# Patient Record
Sex: FEMALE | Race: WHITE
[De-identification: names, ages, dates, MRNs, and addresses within clinical notes are randomized per-mention and may not be internally consistent; named-entity substitution may affect disease eponyms.]

---

## 2018-05-15 ENCOUNTER — HOSPITAL ENCOUNTER (EMERGENCY)
Dept: HOSPITAL 97 - ER | Age: 58
Discharge: HOME | End: 2018-05-15
Payer: COMMERCIAL

## 2018-05-15 DIAGNOSIS — Z85.3: ICD-10-CM

## 2018-05-15 DIAGNOSIS — F17.210: ICD-10-CM

## 2018-05-15 DIAGNOSIS — F32.9: ICD-10-CM

## 2018-05-15 DIAGNOSIS — L30.9: Primary | ICD-10-CM

## 2018-05-15 PROCEDURE — 99283 EMERGENCY DEPT VISIT LOW MDM: CPT

## 2018-05-15 NOTE — ER
Nurse's Notes                                                                                     

 [facility]                                                                

Name: Ghislaine Xiao                                                                                

Age: 57 yrs                                                                                       

Sex: Female                                                                                       

: 1960                                                                                   

MRN: [id number]                                                                                   

Arrival Date: 05/15/2018                                                                          

Time: 03:40                                                                                       

Account#: [id number]                                                                            

Bed 2                                                                                             

Private MD:                                                                                       

Diagnosis: Dermatitis, unspecified                                                                

                                                                                                  

Presentation:                                                                                     

05/15                                                                                             

03:50 Presenting complaint: Patient states: bilateral ankle swelling and rash to lower legs   ak1 

      since Saturday. Transition of care: patient was not received from another setting of        

      care. Onset of symptoms is unknown. Initial Sepsis Screen: Does the patient meet any 2      

      criteria? No. Patient's initial sepsis screen is negative. Does the patient have a          

      suspected source of infection? No. Patient's initial sepsis screen is negative. Care        

      prior to arrival: None.                                                                     

03:50 Method Of Arrival: Ambulatory                                                           ak1 

03:50 Acuity: ANASTASIA 4                                                                           ak1 

                                                                                                  

Triage Assessment:                                                                                

03:54 General: Appears in no apparent distress. Behavior is calm, cooperative. Pain: Denies   ak1 

      pain.                                                                                       

                                                                                                  

Historical:                                                                                       

- Allergies:                                                                                      

03:52 No Known Allergies;                                                                     ak1 

- Home Meds:                                                                                      

03:52 anastrozole 1 mg Oral tab [Active]; venlafaxine 75 mg Oral cp24 1 cap once daily        ak1 

      [Active]; venlafaxine 37.5 mg Oral tab daily [Active]; unknown antifungal [Active];         

- PMHx:                                                                                           

03:52 Cancer, Breast; radiation therapy; Depression;                                          ak1 

- PSHx:                                                                                           

03:52 Cholecystectomy; Tubal ligation; Lumpectomy;                                            ak1 

                                                                                                  

- Immunization history:: Adult Immunizations unknown.                                             

- Social history:: Smoking status: Patient uses tobacco products, smokes one-half pack            

  cigarettes per day.                                                                             

                                                                                                  

                                                                                                  

Screenin:53 Abuse screen: Denies threats or abuse. Denies injuries from another. Nutritional        ak1 

      screening: No deficits noted. Tuberculosis screening: No symptoms or risk factors           

      identified. Fall Risk None identified.                                                      

                                                                                                  

Assessment:                                                                                       

03:54 General: Appears in no apparent distress. Behavior is calm, cooperative, appropriate    lp1 

      for age. Pain: Denies pain. Neuro: Level of Consciousness is awake, alert, obeys            

      commands. Cardiovascular: Patient's skin is warm and dry. Edema is 1+ to Bilateral          

      ankles. Respiratory: Respiratory effort is even, unlabored, Respiratory pattern is          

      regular, symmetrical. GI: No signs and/or symptoms were reported involving the              

      gastrointestinal system. : No signs and/or symptoms were reported regarding the           

      genitourinary system. EENT: No signs and/or symptoms were reported regarding the EENT       

      system. Derm: Rash noted that is red, vesicular, on bilateral lower extremities Denies      

      itching, burning, pain. Musculoskeletal: Circulation, motion, and sensation intact.         

                                                                                                  

Vital Signs:                                                                                      

03:52  / 78; Pulse 74; Resp 18; Temp 98.1(O); Pulse Ox 98% on R/A; Weight 70.31 kg (R); ak1 

      Height 5 ft. 4 in. (162.56 cm) (R); Pain 0/10;                                              

03:52 Body Mass Index 26.61 (70.31 kg, 162.56 cm)                                             ak1 

                                                                                                  

ED Course:                                                                                        

03:40 Patient arrived in ED.                                                                  es  

03:48 Avel Blackman MD is Attending Physician.                                            tw4 

03:50 Triage completed.                                                                       ak1 

03:52 Arm band placed on Patient placed in an exam room, on a stretcher, on pulse oximetry,   ak1 

      Patient notified of wait time.                                                              

03:54 Sil Taylor, RN is Primary Nurse.                                                       lp1 

03:54 Patient has correct armband on for positive identification. Placed in gown. Bed in low  ak1 

      position. Call light in reach. Side rails up X 1. Pulse ox on. NIBP on.                     

04:00 No provider procedures requiring assistance completed. Patient did not have IV access   lp1 

      during this emergency room visit.                                                           

                                                                                                  

Administered Medications:                                                                         

04:07 Drug: predniSONE 40 mg Route: PO;                                                       lp1 

04:24 Follow up: Response: No adverse reaction                                                lp1 

                                                                                                  

                                                                                                  

Outcome:                                                                                          

04:05 Discharge ordered by MD.                                                                tw4 

04:24 Discharged to home ambulatory.                                                          lp1 

04:24 Condition: good                                                                             

04:24 Discharge instructions given to patient, Instructed on discharge instructions, follow       

      up and referral plans. medication usage, Demonstrated understanding of instructions,        

      follow-up care, medications, Prescriptions given X 1.                                       

04:25 Patient left the ED.                                                                    lp1 

                                                                                                  

Signatures:                                                                                       

Brie Jenkins Laura, RN                         RN   lp1                                                  

Cornelia Spencer RN                       RN   ak1                                                  

Avel Blackman MD MD   tw4                                                  

                                                                                                  

Corrections: (The following items were deleted from the chart)                                    

04:00 03:54 Derm: Rash noted that is red, vesicular, Denies itching, burning, pain, lp1       lp1 

04:25 03:54 Cardiovascular: Patient's skin is warm and dry. lp1                               lp1 

                                                                                                  

**************************************************************************************************

## 2018-05-15 NOTE — EDPHYS
Physician Documentation                                                                           

 [facility]                                                                

Name: Ghislaine Xiao                                                                                

Age: 57 yrs                                                                                       

Sex: Female                                                                                       

: 1960                                                                                   

MRN: [id number]                                                                                   

Arrival Date: 05/15/2018                                                                          

Time: 03:40                                                                                       

Account#: [id number]                                                                            

Bed 2                                                                                             

Private MD:                                                                                       

ED Physician Avel Blackman                                                                     

HPI:                                                                                              

05/15                                                                                             

04:02 This 57 yrs old  Female presents to ER via Ambulatory with complaints of Rash, tw4 

      Feet Swelling.                                                                              

04:02 The patient's rash thought to be caused by an unknown cause. The rash is located on the tw4 

      right leg and left shin. The rash can be described as macular. Onset: The                   

      symptoms/episode began/occurred 3 day(s) ago. Associated signs and symptoms: Pertinent      

      positives: None. Pertinent negatives: None. Severity of symptoms: At their worst the        

      symptoms were moderate in the emergency department the symptoms are unchanged. The          

      patient has not experienced similar symptoms in the past.                                   

                                                                                                  

Historical:                                                                                       

- Allergies:                                                                                      

03:52 No Known Allergies;                                                                     ak1 

- Home Meds:                                                                                      

03:52 anastrozole 1 mg Oral tab [Active]; venlafaxine 75 mg Oral cp24 1 cap once daily        ak1 

      [Active]; venlafaxine 37.5 mg Oral tab daily [Active]; unknown antifungal [Active];         

- PMHx:                                                                                           

03:52 Cancer, Breast; radiation therapy; Depression;                                          ak1 

- PSHx:                                                                                           

03:52 Cholecystectomy; Tubal ligation; Lumpectomy;                                            ak1 

                                                                                                  

- Immunization history:: Adult Immunizations unknown.                                             

- Social history:: Smoking status: Patient uses tobacco products, smokes one-half pack            

  cigarettes per day.                                                                             

                                                                                                  

                                                                                                  

ROS:                                                                                              

04:02 Constitutional: Negative for fever, chills, and weight loss, Eyes: Negative for injury, tw4 

      pain, redness, and discharge, Cardiovascular: Negative for chest pain, palpitations,        

      and edema, Respiratory: Negative for shortness of breath, cough, wheezing, and              

      pleuritic chest pain, Abdomen/GI: Negative for abdominal pain, nausea, vomiting,            

      diarrhea, and constipation, Back: Negative for injury and pain, MS/Extremity: Negative      

      for injury and deformity, Neuro: Negative for headache, weakness, numbness, tingling,       

      and seizure.                                                                                

04:02 Skin: Positive for rash.                                                                    

                                                                                                  

Exam:                                                                                             

04:02 Constitutional:  This is a well developed, well nourished patient who is awake, alert,  tw4 

      and in no acute distress. Head/Face:  Normocephalic, atraumatic. Chest/axilla:  Normal      

      chest wall appearance and motion.  Nontender with no deformity.  No lesions are             

      appreciated. Cardiovascular:  Regular rate and rhythm with a normal S1 and S2.  No          

      gallops, murmurs, or rubs.  Normal PMI, no JVD.  No pulse deficits. Respiratory:  Lungs     

      have equal breath sounds bilaterally, clear to auscultation and percussion.  No rales,      

      rhonchi or wheezes noted.  No increased work of breathing, no retractions or nasal          

      flaring. Abdomen/GI:  Soft, non-tender, with normal bowel sounds.  No distension or         

      tympany.  No guarding or rebound.  No evidence of tenderness throughout. Back:  No          

      spinal tenderness.  No costovertebral tenderness.  Full range of motion.                    

04:02 Skin: Appearance: normal except for affected area, rash a mild rash is noted, rash can      

      be described as macular, nonspecific, Turgor: is excellent.                                 

                                                                                                  

Vital Signs:                                                                                      

03:52  / 78; Pulse 74; Resp 18; Temp 98.1(O); Pulse Ox 98% on R/A; Weight 70.31 kg (R); ak1 

      Height 5 ft. 4 in. (162.56 cm) (R); Pain 0/10;                                              

03:52 Body Mass Index 26.61 (70.31 kg, 162.56 cm)                                             ak1 

                                                                                                  

MDM:                                                                                              

03:48 Patient medically screened.                                                             tw4 

04:02 Data reviewed: vital signs, nurses notes. Counseling: I had a detailed discussion with  4 

      the patient and/or guardian regarding: the historical points, exam findings, and any        

      diagnostic results supporting the discharge/admit diagnosis. Special discussion: I          

      discussed with the patient/guardian in detail that at this point there is no indication     

      for admission to the hospital. It is understood, however, that if the symptoms persist      

      or worsen the patient needs to return immediately for re-evaluation.                        

                                                                                                  

Administered Medications:                                                                         

04:07 Drug: predniSONE 40 mg Route: PO;                                                       lp1 

04:24 Follow up: Response: No adverse reaction                                                lp1 

                                                                                                  

                                                                                                  

Disposition:                                                                                      

05/15/18 04:05 Discharged to Home. Impression: Dermatitis, unspecified.                           

- Condition is Stable.                                                                            

- Discharge Instructions: Rash.                                                                   

- Prescriptions for Medrol (Tio) 4 mg Oral Tablets, Dose Pack - take 1 tablet by ORAL             

  route as directed - follow package instructions; 1 packet.                                      

- Medication Reconciliation Form, Thank You Letter, Antibiotic Education, Prescription            

  Opioid Use form.                                                                                

- Follow up: Private Physician; When: As needed; Reason: Recheck today's complaints,              

  Continuance of care, Re-evaluation by your physician.                                           

- Problem is new.                                                                                 

- Symptoms are unchanged.                                                                         

                                                                                                  

                                                                                                  

                                                                                                  

Signatures:                                                                                       

Sil Taylor RN                         RN   lp1                                                  

Cornelia Spencer RN                       RN   ak1                                                  

Avel Blackman MD MD   tw4                                                  

                                                                                                  

Corrections: (The following items were deleted from the chart)                                    

04:25 04:05 05/15/2018 04:05 Discharged to Home. Impression: Dermatitis, unspecified.         lp1 

      Condition is Stable. Forms are Medication Reconciliation Form, Thank You Letter,            

      Antibiotic Education, Prescription Opioid Use. Follow up: Private Physician; When: As       

      needed; Reason: Recheck today's complaints, Continuance of care, Re-evaluation by your      

      physician. Problem is new. Symptoms are unchanged. tw4                                      

                                                                                                  

**************************************************************************************************